# Patient Record
Sex: FEMALE | URBAN - METROPOLITAN AREA
[De-identification: names, ages, dates, MRNs, and addresses within clinical notes are randomized per-mention and may not be internally consistent; named-entity substitution may affect disease eponyms.]

---

## 2024-05-15 ENCOUNTER — TELEPHONE (OUTPATIENT)
Age: 44
End: 2024-05-15

## 2024-05-15 NOTE — TELEPHONE ENCOUNTER
Called pt ,verified pt with two pt identifiers, advised pt calling to schedule appt. Scheduled appt on 7/3/24 at 10 am with . gave her address and to arrive 15 mins early for registration. Asked if I can get records from her provider for . she advised me of Dr.Andrew Sen at [facility] in [place]. She stated she has labs, notes and CT scan done recently.advised I will call and get records faxed over to us. Pt thanked me for call and verbalized understanding.      Called [facility] in [place] and left voice message to fax us most recent office note, labs, imaging to my attn. Left phone # to call if needed.

## 2024-07-03 ENCOUNTER — OFFICE VISIT (OUTPATIENT)
Age: 44
End: 2024-07-03

## 2024-07-03 VITALS
HEART RATE: 90 BPM | OXYGEN SATURATION: 100 % | BODY MASS INDEX: 29.4 KG/M2 | SYSTOLIC BLOOD PRESSURE: 116 MMHG | WEIGHT: 210 LBS | DIASTOLIC BLOOD PRESSURE: 84 MMHG | RESPIRATION RATE: 15 BRPM | HEIGHT: 71 IN

## 2024-07-03 DIAGNOSIS — G62.9 NEUROPATHY: ICD-10-CM

## 2024-07-03 DIAGNOSIS — G60.9 IDIOPATHIC PERIPHERAL NEUROPATHY: ICD-10-CM

## 2024-07-03 DIAGNOSIS — G62.9 NEUROPATHY: Primary | ICD-10-CM

## 2024-07-03 DIAGNOSIS — M79.2 NEUROPATHIC PAIN: ICD-10-CM

## 2024-07-03 DIAGNOSIS — M51.36 LUMBAR DEGENERATIVE DISC DISEASE: ICD-10-CM

## 2024-07-03 LAB — VIT B12 SERPL-MCNC: 718 PG/ML (ref 193–986)

## 2024-07-03 RX ORDER — FLUOXETINE HYDROCHLORIDE 40 MG/1
40 CAPSULE ORAL DAILY
COMMUNITY

## 2024-07-03 RX ORDER — LANOLIN ALCOHOL/MO/W.PET/CERES
3 CREAM (GRAM) TOPICAL NIGHTLY PRN
COMMUNITY
Start: 2024-07-01

## 2024-07-03 RX ORDER — ELECTROLYTES/DEXTROSE
SOLUTION, ORAL ORAL DAILY
COMMUNITY
Start: 2024-06-02

## 2024-07-03 RX ORDER — PREGABALIN 150 MG/1
150 CAPSULE ORAL 2 TIMES DAILY
COMMUNITY

## 2024-07-03 RX ORDER — PHENTERMINE HYDROCHLORIDE 15 MG/1
15 CAPSULE ORAL EVERY MORNING
COMMUNITY
Start: 2024-06-14

## 2024-07-03 RX ORDER — VITAMIN B COMPLEX
1 CAPSULE ORAL DAILY
COMMUNITY

## 2024-07-03 RX ORDER — ASPIRIN 81 MG/1
81 TABLET ORAL DAILY
COMMUNITY

## 2024-07-03 ASSESSMENT — PATIENT HEALTH QUESTIONNAIRE - PHQ9
SUM OF ALL RESPONSES TO PHQ QUESTIONS 1-9: 0
SUM OF ALL RESPONSES TO PHQ9 QUESTIONS 1 & 2: 0
2. FEELING DOWN, DEPRESSED OR HOPELESS: NOT AT ALL
1. LITTLE INTEREST OR PLEASURE IN DOING THINGS: NOT AT ALL
SUM OF ALL RESPONSES TO PHQ QUESTIONS 1-9: 0

## 2024-07-03 NOTE — PROGRESS NOTES
1. Have you been to the ER, urgent care clinic since your last visit?  Hospitalized since your last visit?  No.    2. Have you seen or consulted any other health care providers outside of the [facility] since your last visit?  Include any pap smears or colon screening.   No.      Chief Complaint   Patient presents with    New Patient     Neuropathy and lower back issues       
triceps biceps Wrist ext. Wrist flex. intrinsics Hip flex. Hip ext. Knee ext.  Knee flex Dorsi flex Plantar flex   Right 5 5 5 5 5 5 5 5 5 5 5 5   Left 5 5 5 5 5 5 5 5 5 5 5 5         Sensory:  Upper extremity: She does have decreased pinprick in the ulnar distribution bilaterally.  This is worse on the left  Lower extremity: She has absent vibration in her toes.  It is present for 2 seconds at her ankles.  Is present for 6 seconds at her knees.  Pinprick was decreased to the level of her knees bilaterally    Reflexes:    Right Left  Biceps  2 2  Triceps 2 2  Brachiorad. 2 2  Patella  1 1  Achilles - -    Plantar response:  flexor bilaterally      Cerebellar testing:  no tremor apparent, finger/nose and batsheva were intact    Romberg: absent.  She does have significant amount of swelling    Gait: Minimally unsteady.  She does have difficulty with tandem walking                        The patient should return to clinic for her EMG    Renewed medication: None    I spent   65 minutes on the day of the encounter preparing the office visit by reviewing medical records, obtaining a history, performing examination, counseling and educating the patient and family members on diagnosis, ordering tests, documenting in the clinical medical record, and coordinating the care for the patient.  The patient had the ability to ask questions and all questions were answered.        Signed By:  Gio Dupree DO FAAN    July 3, 2024

## 2024-07-08 LAB
C-ANCA TITR SER IF: NORMAL TITER
CENTROMERE B AB SER-ACNC: <0.2 AI (ref 0–0.9)
CHROMATIN AB SERPL-ACNC: <0.2 AI (ref 0–0.9)
DSDNA AB SER-ACNC: <1 IU/ML (ref 0–9)
ENA JO1 AB SER-ACNC: <0.2 AI (ref 0–0.9)
ENA RNP AB SER-ACNC: <0.2 AI (ref 0–0.9)
ENA SCL70 AB SER-ACNC: <0.2 AI (ref 0–0.9)
ENA SM AB SER-ACNC: <0.2 AI (ref 0–0.9)
ENA SM+RNP AB SER-ACNC: <0.2 AI (ref 0–0.9)
ENA SS-A AB SER-ACNC: <0.2 AI (ref 0–0.9)
ENA SS-B AB SER-ACNC: <0.2 AI (ref 0–0.9)
MYELOPEROXIDASE AB SER IA-ACNC: <0.2 UNITS (ref 0–0.9)
P-ANCA ATYPICAL TITR SER IF: NORMAL TITER
P-ANCA TITR SER IF: NORMAL TITER
PROTEINASE3 AB SER IA-ACNC: <0.2 UNITS (ref 0–0.9)
RIBOSOMAL P AB SER-ACNC: <0.2 AI (ref 0–0.9)
SEE BELOW:, 164879: NORMAL

## 2024-08-01 ENCOUNTER — PROCEDURE VISIT (OUTPATIENT)
Age: 44
End: 2024-08-01
Payer: COMMERCIAL

## 2024-08-01 DIAGNOSIS — R20.0 NUMBNESS AND TINGLING OF BOTH LEGS: ICD-10-CM

## 2024-08-01 DIAGNOSIS — G62.9 SENSORY NEUROPATHY: Primary | ICD-10-CM

## 2024-08-01 DIAGNOSIS — R20.2 NUMBNESS AND TINGLING OF BOTH LEGS: ICD-10-CM

## 2024-08-01 PROCEDURE — 95913 NRV CNDJ TEST 13/> STUDIES: CPT | Performed by: PSYCHIATRY & NEUROLOGY

## 2024-08-01 PROCEDURE — 95886 MUSC TEST DONE W/N TEST COMP: CPT | Performed by: PSYCHIATRY & NEUROLOGY

## 2024-08-01 NOTE — PROGRESS NOTES
ELECTRODIANOSTIC REPORT  Test Date:  2024    Patient: Judy Tai : 1980 Physician: Dr. Dupree   Sex: Female Tech: Virginia Jacobo, EMG Technician  Ref Phys: Dr. Dupree     CHIEF COMPLAINTS:  Patient is a 44 year-old female with a diagnosis of recent marginal zone Phoma status postchemotherapy who presents with sensory disturbance, numbness in her upper and lower extremities.  She does have length-dependent sensory loss.  Gait was stable.    EMG & NCV Findings:    Nerve conduction studies as listed below revealed a mildly reduced amplitude of the bilateral sural sensory nerves.  The bilateral superficial fibular sensory nerves were normal.  The right median sensory, radial sensory and ulnar sensory studies were normal.  The bilateral fibular motor nerve conduction study and tibial motor nerve conduction studies were normal.  The right median and ulnar study was normal.    Disposable concentric needle examination of the muscles listed below in the left lower extremity was normal.      Impression:    This study was abnormal.  There is electrodiagnostic evidence upon today's examination suggestin.  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    2.  There is no evidence of a focal neuropathy, myopathy or lumbar radiculopathy.    __________________  Gio Dupree D.O. FAAN        Nerve Conduction Studies  Anti Sensory Summary Table     Stim Site NR Peak (ms) Norm Peak (ms) O-P Amp (µV) Norm O-P Amp Site1 Site2 Dist (cm)   Right Median Anti Sensory (2nd Digit)  34.1 °C   Wrist    3.2 <4 13.2 >11 Wrist 2nd Digit 14.0   Right Radial Anti Sensory (Base 1st Digit)  34.1 °C   Wrist    2.1 <2.9 15.5 >15 Wrist Base 1st Digit 10.0   Left Sup Fibular Anti Sensory (Lat ankle)  34.1 °C   Lower leg    2.9 <4.6 5.5 >4 Lower leg Lat ankle 10.0   Right Sup Fibular Anti Sensory (Lat ankle)  34.1 °C   Lower leg    3.0 <4.6 5.7 >4 Lower leg Lat ankle 10.0   Left Sural